# Patient Record
Sex: FEMALE | Race: WHITE | NOT HISPANIC OR LATINO | Employment: UNEMPLOYED | URBAN - METROPOLITAN AREA
[De-identification: names, ages, dates, MRNs, and addresses within clinical notes are randomized per-mention and may not be internally consistent; named-entity substitution may affect disease eponyms.]

---

## 2018-10-08 PROBLEM — M50.122 CERVICAL DISC DISORDER AT C5-C6 LEVEL WITH RADICULOPATHY: Status: ACTIVE | Noted: 2018-10-08

## 2018-11-21 PROBLEM — T81.89XA SURGICAL WOUND, NON HEALING, INITIAL ENCOUNTER: Status: ACTIVE | Noted: 2018-11-21

## 2019-02-10 ENCOUNTER — HOSPITAL ENCOUNTER (EMERGENCY)
Facility: HOSPITAL | Age: 44
Discharge: HOME | End: 2019-02-10
Attending: EMERGENCY MEDICINE

## 2019-02-10 VITALS
DIASTOLIC BLOOD PRESSURE: 63 MMHG | TEMPERATURE: 97 F | WEIGHT: 191.56 LBS | RESPIRATION RATE: 18 BRPM | BODY MASS INDEX: 32.88 KG/M2 | OXYGEN SATURATION: 100 % | HEART RATE: 79 BPM | SYSTOLIC BLOOD PRESSURE: 147 MMHG

## 2019-02-10 DIAGNOSIS — L02.91 ABSCESS: Primary | ICD-10-CM

## 2019-02-10 PROCEDURE — 87186 SC STD MICRODIL/AGAR DIL: CPT

## 2019-02-10 PROCEDURE — 87070 CULTURE OTHR SPECIMN AEROBIC: CPT

## 2019-02-10 PROCEDURE — 10160 PNXR ASPIR ABSC HMTMA BULLA: CPT

## 2019-02-10 PROCEDURE — 87077 CULTURE AEROBIC IDENTIFY: CPT

## 2019-02-10 PROCEDURE — 99284 EMERGENCY DEPT VISIT MOD MDM: CPT | Mod: 25

## 2019-02-10 PROCEDURE — 25000003 PHARM REV CODE 250: Performed by: EMERGENCY MEDICINE

## 2019-02-10 PROCEDURE — 10121 INC&RMVL FB SUBQ TISS COMP: CPT

## 2019-02-10 RX ORDER — MUPIROCIN 20 MG/G
1 OINTMENT TOPICAL
Status: COMPLETED | OUTPATIENT
Start: 2019-02-10 | End: 2019-02-10

## 2019-02-10 RX ORDER — CLINDAMYCIN HYDROCHLORIDE 300 MG/1
300 CAPSULE ORAL 4 TIMES DAILY
Qty: 28 CAPSULE | Refills: 0 | Status: SHIPPED | OUTPATIENT
Start: 2019-02-10 | End: 2019-02-17

## 2019-02-10 RX ORDER — MUPIROCIN 20 MG/G
OINTMENT TOPICAL 3 TIMES DAILY
Qty: 15 G | Refills: 0 | Status: SHIPPED | OUTPATIENT
Start: 2019-02-10 | End: 2019-02-20

## 2019-02-10 RX ORDER — HYDROCODONE BITARTRATE AND ACETAMINOPHEN 5; 325 MG/1; MG/1
1 TABLET ORAL EVERY 4 HOURS PRN
Qty: 15 TABLET | Refills: 0 | Status: SHIPPED | OUTPATIENT
Start: 2019-02-10 | End: 2019-02-20

## 2019-02-10 RX ORDER — CLINDAMYCIN HYDROCHLORIDE 150 MG/1
300 CAPSULE ORAL
Status: COMPLETED | OUTPATIENT
Start: 2019-02-10 | End: 2019-02-10

## 2019-02-10 RX ORDER — MUPIROCIN CALCIUM 20 MG/G
CREAM TOPICAL
Status: DISCONTINUED | OUTPATIENT
Start: 2019-02-10 | End: 2019-02-10

## 2019-02-10 RX ADMIN — MUPIROCIN 22 G: 20 OINTMENT TOPICAL at 07:02

## 2019-02-10 RX ADMIN — CLINDAMYCIN HYDROCHLORIDE 300 MG: 150 CAPSULE ORAL at 07:02

## 2019-02-11 NOTE — ED NOTES
Aspiration to red swollen area posterior neck done per Dr. Fernando. Wound culture collected. Patient tolerated well.

## 2019-02-11 NOTE — ED NOTES
Discharge instructions and rx x3 given, patient voiced understanding. Discharged to home in stable condition, ambulatory out of ER w steady gait, NAD.

## 2019-02-11 NOTE — ED PROVIDER NOTES
Encounter Date: 2/10/2019       History     Chief Complaint   Patient presents with    Abscess     Patient states history of wound issue to area after surgery.   States followed by Wound Care for issue in the past      The history is provided by the patient.   Abscess    This is a recurrent problem. The current episode started several days ago. The problem has been unchanged. The abscess is present on the neck. The pain is at a severity of 2/10. The abscess is characterized by draining, swelling and dryness. Pertinent negatives include no fever, no diarrhea, no vomiting, no congestion and no cough. There were no sick contacts. She has received no recent medical care.     Review of patient's allergies indicates:  No Known Allergies  Past Medical History:   Diagnosis Date    Anxiety     Cerebral palsy     Cervical disc disorder     Draining postoperative wound     Hypothyroidism     Renal stones     Sleep apnea with use of continuous positive airway pressure (CPAP)      Past Surgical History:   Procedure Laterality Date    CHOLECYSTECTOMY      DEBRIDEMENT, WOUND posterior neck N/A 11/26/2018    Performed by Quinten Connell MD at [facility] OR    DISCECTOMY, SPINE, MINIMALLY INVASIVE, USING MICROSCOPE, RIGHT C5-6 POSTERIOR MINIMALLY INVASIVE LAMINECTOMY FOR MICRODISCECTOMY  10/8/2018    Performed by Jose Daniel Stout MD at [facility] OR    FOOT SURGERY Left     tendon repair @ 1 yrs old    HYSTERECTOMY      TONSILLECTOMY       Family History   Problem Relation Age of Onset    Hypertension Mother     Cancer Mother     Heart disease Mother     No Known Problems Father      Social History     Tobacco Use    Smoking status: Never Smoker    Smokeless tobacco: Never Used   Substance Use Topics    Alcohol use: Yes     Alcohol/week: 1.2 oz     Types: 2 Cans of beer per week     Frequency: Monthly or less     Drinks per session: 1 or 2     Binge frequency: Less than monthly     Comment: occasionally    Drug use:  No     Review of Systems   Constitutional: Negative for fever.   HENT: Negative for congestion, ear discharge and ear pain.    Eyes: Negative for pain and redness.   Respiratory: Negative for cough.    Gastrointestinal: Negative for diarrhea and vomiting.   Genitourinary: Negative for flank pain.   Musculoskeletal: Negative for back pain, gait problem, neck pain and neck stiffness.   Skin: Positive for wound.   Neurological: Negative for weakness.       Physical Exam     Initial Vitals [02/10/19 1912]   BP Pulse Resp Temp SpO2   (!) 147/63 79 18 97.2 °F (36.2 °C) 100 %      MAP       --         Physical Exam    Nursing note and vitals reviewed.  Constitutional: She appears well-developed and well-nourished. She is not diaphoretic. No distress.   HENT:   Head: Normocephalic and atraumatic.   Eyes: EOM are normal. Pupils are equal, round, and reactive to light.   Neck: Normal range of motion. Neck supple. No JVD present.   Pulmonary/Chest: Breath sounds normal. No stridor. No respiratory distress. She has no wheezes. She has no rhonchi. She has no rales.   Abdominal: Soft. Bowel sounds are normal. She exhibits no distension. There is no tenderness. There is no rebound and no guarding.   Musculoskeletal: Normal range of motion. She exhibits no edema or tenderness.   Neurological: She is alert and oriented to person, place, and time. No sensory deficit.   Skin: Abscess noted.         ED Course   Abscess Aspiration  Date/Time: 2/10/2019 7:42 PM  Performed by: Julian Fernando MD  Authorized by: Julian Fernando MD     Consent Done?:  Not Needed  A time out verifies correct patient, procedure, equipment, support staff and site/side marked as required:   Procedure Details:     Site prepped with:  Betadine    Location of Abscess #1:  Posterior neck    Size of needle #1:  20    Aspirated amount #1 (mL):  1  Material send for:  Aerobic Culture  Post-procedure:     Patient tolerance:  Patient tolerated the procedure well with  no immediate complications        Labs Reviewed - No data to display       Imaging Results    None                               Clinical Impression:   The encounter diagnosis was Abscess.      Disposition:   Disposition: Discharged  Condition: Stable                        Julian Fernando MD  02/10/19 1945       Julian Fernando MD  02/10/19 1946

## 2019-02-11 NOTE — ED TRIAGE NOTES
Had sx to back of neck in Oct 2018, had wound vac due to lack of healing, wound vac removed and wound completely healed. Stated starting with redness, warmth and swelling 2 days ago

## 2019-02-13 LAB — BACTERIA SPEC AEROBE CULT: NORMAL

## 2020-10-15 ENCOUNTER — OFFICE VISIT (OUTPATIENT)
Dept: OBSTETRICS AND GYNECOLOGY | Facility: CLINIC | Age: 45
End: 2020-10-15
Payer: COMMERCIAL

## 2020-10-15 ENCOUNTER — PATIENT MESSAGE (OUTPATIENT)
Dept: OBSTETRICS AND GYNECOLOGY | Facility: CLINIC | Age: 45
End: 2020-10-15

## 2020-10-15 VITALS
DIASTOLIC BLOOD PRESSURE: 74 MMHG | WEIGHT: 185.38 LBS | RESPIRATION RATE: 16 BRPM | HEART RATE: 84 BPM | SYSTOLIC BLOOD PRESSURE: 90 MMHG | HEIGHT: 64 IN | BODY MASS INDEX: 31.65 KG/M2

## 2020-10-15 DIAGNOSIS — E66.9 OBESITY (BMI 30-39.9): ICD-10-CM

## 2020-10-15 DIAGNOSIS — Z90.710 HISTORY OF HYSTERECTOMY FOR BENIGN DISEASE: ICD-10-CM

## 2020-10-15 DIAGNOSIS — Z01.419 WELL WOMAN EXAM WITH ROUTINE GYNECOLOGICAL EXAM: Primary | ICD-10-CM

## 2020-10-15 DIAGNOSIS — Z12.31 ENCOUNTER FOR SCREENING MAMMOGRAM FOR MALIGNANT NEOPLASM OF BREAST: ICD-10-CM

## 2020-10-15 DIAGNOSIS — Z71.3 WEIGHT LOSS COUNSELING, ENCOUNTER FOR: ICD-10-CM

## 2020-10-15 PROCEDURE — 3008F BODY MASS INDEX DOCD: CPT | Mod: CPTII,S$GLB,, | Performed by: OBSTETRICS & GYNECOLOGY

## 2020-10-15 PROCEDURE — 99386 PR PREVENTIVE VISIT,NEW,40-64: ICD-10-PCS | Mod: S$GLB,,, | Performed by: OBSTETRICS & GYNECOLOGY

## 2020-10-15 PROCEDURE — 3008F PR BODY MASS INDEX (BMI) DOCUMENTED: ICD-10-PCS | Mod: CPTII,S$GLB,, | Performed by: OBSTETRICS & GYNECOLOGY

## 2020-10-15 PROCEDURE — 99999 PR PBB SHADOW E&M-EST. PATIENT-LVL III: ICD-10-PCS | Mod: PBBFAC,,, | Performed by: OBSTETRICS & GYNECOLOGY

## 2020-10-15 PROCEDURE — 99386 PREV VISIT NEW AGE 40-64: CPT | Mod: S$GLB,,, | Performed by: OBSTETRICS & GYNECOLOGY

## 2020-10-15 PROCEDURE — 99999 PR PBB SHADOW E&M-EST. PATIENT-LVL III: CPT | Mod: PBBFAC,,, | Performed by: OBSTETRICS & GYNECOLOGY

## 2020-10-15 RX ORDER — ALPRAZOLAM 0.25 MG/1
TABLET ORAL
COMMUNITY
End: 2021-04-28

## 2020-10-15 RX ORDER — BACLOFEN 10 MG/1
TABLET ORAL
COMMUNITY
Start: 2020-08-28

## 2020-10-15 RX ORDER — BUPROPION HYDROCHLORIDE 300 MG/1
TABLET ORAL
COMMUNITY
Start: 2020-10-05

## 2020-10-15 RX ORDER — GABAPENTIN 300 MG/1
300 CAPSULE ORAL
COMMUNITY
Start: 2020-09-22 | End: 2023-05-24

## 2020-10-15 RX ORDER — PHENTERMINE HYDROCHLORIDE 37.5 MG/1
37.5 TABLET ORAL
Qty: 30 TABLET | Refills: 0 | Status: SHIPPED | OUTPATIENT
Start: 2020-10-15 | End: 2020-10-15 | Stop reason: SDUPTHER

## 2020-10-15 RX ORDER — PHENTERMINE HYDROCHLORIDE 37.5 MG/1
37.5 TABLET ORAL
Qty: 30 TABLET | Refills: 0 | Status: SHIPPED | OUTPATIENT
Start: 2020-10-15 | End: 2021-04-28

## 2020-10-15 NOTE — PROGRESS NOTES
Subjective:    Patient ID: Raquel Mitchell is a 44 y.o. y.o. female.     Chief Complaint: Annual Well Woman Exam     History of Present Illness:  Raquel presents today for Annual Well Woman exam. She describes her menses as absent s/p hysterectomy.She denies pelvic pain.  She denies breast tenderness, masses, nipple discharge. She denies difficulty with urination or bowel movements. She denies menopausal symptoms such as hotflashes, vaginal dryness, and night sweats. She denies bloating, early satiety, or weight changes. She is sexually active. Contraception is by no method.    Patient reports she started dieting and has hit a plateau; desires something to aid in weight loss. Discussed R/B/A. Will send Rx for Adipex. Patient's BMI 32.     The following portions of the patient's history were reviewed and updated as appropriate: allergies, current medications, past family history, past medical history, past social history, past surgical history and problem list.      Menstrual History:   No LMP recorded. Patient has had a hysterectomy..     OB History    No obstetric history on file.         The following portions of the patient's history were reviewed and updated as appropriate: allergies, current medications, past family history, past medical history, past social history, past surgical history and problem list.        ROS:     Review of Systems   Constitutional: Negative for activity change, appetite change, chills, diaphoresis, fatigue, fever and unexpected weight change.   HENT: Negative for mouth sores and tinnitus.    Eyes: Negative for discharge and visual disturbance.   Respiratory: Negative for cough, shortness of breath and wheezing.    Cardiovascular: Negative for chest pain, palpitations and leg swelling.   Gastrointestinal: Negative for abdominal pain, bloating, blood in stool, constipation, diarrhea, nausea and vomiting.   Endocrine: Negative for diabetes, hair loss, hot flashes,  "hyperthyroidism and hypothyroidism.   Genitourinary: Negative for dysmenorrhea, dyspareunia, dysuria, flank pain, frequency, genital sores, hematuria, menstrual problem, pelvic pain, urgency, vaginal bleeding, vaginal discharge, vaginal pain, postcoital bleeding and vaginal odor.   Musculoskeletal: Negative for back pain, joint swelling and myalgias.   Integumentary:  Negative for rash, acne, breast mass, nipple discharge and breast skin changes.   Neurological: Negative for seizures, syncope, numbness and headaches.   Hematological: Negative for adenopathy. Does not bruise/bleed easily.   Psychiatric/Behavioral: Negative for depression and sleep disturbance. The patient is not nervous/anxious.    Breast: Negative for mass, mastodynia, nipple discharge and skin changes      Objective:    Vital Signs:  Vitals:    10/15/20 1202   Resp: 16   Weight: 84.1 kg (185 lb 6.4 oz)   Height: 5' 4" (1.626 m)         Physical Exam:  General:  alert, cooperative, appears stated age   Skin:  Skin color, texture, turgor normal. No rashes or lesions   HEENT:  conjunctivae/corneas clear. PERRL.   Neck: supple, trachea midline, no adenopathy or thyromegally   Respiratory:  clear to auscultation bilaterally   Heart:  regular rate and rhythm, S1, S2 normal, no murmur, click, rub or gallop   Breasts:  no discharge, erythema, or tenderness   Abdomen:  normal findings: bowel sounds normal, no masses palpable, no organomegaly and soft, non-tender   Pelvis: External genitalia: normal general appearance  Urinary system: urethral meatus normal, bladder nontender  Vaginal: atrophic mucosa  Cervix: absent, removed surgically  Uterus: absent, removed surgically  Adnexa: non palpable   Extremities: Normal ROM; no edema, no cyanosis   Neurologial: Normal strength and tone. No focal numbness or weakness. Reflexes 2+ and equal.   Psychiatric: normal mood, speech, dress, and thought processes         Assessment:       Healthy female exam.     1. Well " woman exam with routine gynecological exam    2. History of hysterectomy for benign disease    3. Encounter for screening mammogram for malignant neoplasm of breast    4. Weight loss counseling, encounter for    5. Obesity (BMI 30-39.9)          Plan:      Pap smear deferred per guidelines  MMG ordered  Rx of Adipex; RTC in 1 month for BP/weight check    COUNSELING:  Raquel was counseled on STD pevention, use and side-effects of various contraceptive measures, A.C.O.G. Pap guidelines and recommendations for yearly pelvic exams in addition to recommendations for monthly self breast exams; to see her PCP for other health maintenance.

## 2020-10-15 NOTE — TELEPHONE ENCOUNTER
----- Message from Nena Edwards MA sent at 10/15/2020 12:48 PM CDT -----  Contact: self  Raquel Mitchell  MRN: [id number]  Home Phone      [phone]  Work Phone      Not on file.  Mobile          [phone]    Patient Care Team:  Olga Pollack MD as PCP - General (Family Medicine)  OB? No  What phone number can you be reached at? [phone]  Message:  Stated Rx for Adipex was sent to wrong pharmacy.  Would like to know if Rx can be resent to [facility].    Pharmacy:  [facility]

## 2021-01-22 ENCOUNTER — HOSPITAL ENCOUNTER (OUTPATIENT)
Dept: RADIOLOGY | Facility: HOSPITAL | Age: 46
Discharge: HOME | End: 2021-01-22
Attending: OBSTETRICS & GYNECOLOGY
Payer: COMMERCIAL

## 2021-01-22 VITALS — WEIGHT: 185 LBS | HEIGHT: 64 IN | BODY MASS INDEX: 31.58 KG/M2

## 2021-01-22 DIAGNOSIS — Z12.31 ENCOUNTER FOR SCREENING MAMMOGRAM FOR MALIGNANT NEOPLASM OF BREAST: ICD-10-CM

## 2021-01-22 PROCEDURE — 77067 SCR MAMMO BI INCL CAD: CPT | Mod: 26,,, | Performed by: RADIOLOGY

## 2021-01-22 PROCEDURE — 77067 SCR MAMMO BI INCL CAD: CPT | Mod: TC

## 2021-01-22 PROCEDURE — 77063 MAMMO DIGITAL SCREENING BILAT WITH TOMO: ICD-10-PCS | Mod: 26,,, | Performed by: RADIOLOGY

## 2021-01-22 PROCEDURE — 77063 BREAST TOMOSYNTHESIS BI: CPT | Mod: 26,,, | Performed by: RADIOLOGY

## 2021-01-22 PROCEDURE — 77067 MAMMO DIGITAL SCREENING BILAT WITH TOMO: ICD-10-PCS | Mod: 26,,, | Performed by: RADIOLOGY

## 2021-02-09 ENCOUNTER — PATIENT MESSAGE (OUTPATIENT)
Dept: OBSTETRICS AND GYNECOLOGY | Facility: CLINIC | Age: 46
End: 2021-02-09

## 2021-04-06 ENCOUNTER — PATIENT MESSAGE (OUTPATIENT)
Dept: OBSTETRICS AND GYNECOLOGY | Facility: CLINIC | Age: 46
End: 2021-04-06

## 2021-04-26 ENCOUNTER — PATIENT MESSAGE (OUTPATIENT)
Dept: OBSTETRICS AND GYNECOLOGY | Facility: CLINIC | Age: 46
End: 2021-04-26

## 2021-04-28 ENCOUNTER — OFFICE VISIT (OUTPATIENT)
Dept: OBSTETRICS AND GYNECOLOGY | Facility: CLINIC | Age: 46
End: 2021-04-28
Payer: COMMERCIAL

## 2021-04-28 VITALS
WEIGHT: 183 LBS | BODY MASS INDEX: 31.24 KG/M2 | RESPIRATION RATE: 19 BRPM | HEART RATE: 64 BPM | HEIGHT: 64 IN | DIASTOLIC BLOOD PRESSURE: 70 MMHG | SYSTOLIC BLOOD PRESSURE: 92 MMHG

## 2021-04-28 DIAGNOSIS — N39.3 SUI (STRESS URINARY INCONTINENCE, FEMALE): Primary | ICD-10-CM

## 2021-04-28 DIAGNOSIS — N95.1 PERIMENOPAUSAL VASOMOTOR SYMPTOMS: ICD-10-CM

## 2021-04-28 DIAGNOSIS — R35.0 URINARY FREQUENCY: ICD-10-CM

## 2021-04-28 DIAGNOSIS — Z79.890 HORMONE REPLACEMENT THERAPY (HRT): ICD-10-CM

## 2021-04-28 LAB
BILIRUB SERPL-MCNC: ABNORMAL MG/DL
BLOOD URINE, POC: ABNORMAL
CLARITY, POC UA: CLEAR
COLOR, POC UA: YELLOW
GLUCOSE UR QL STRIP: ABNORMAL
KETONES UR QL STRIP: ABNORMAL
LEUKOCYTE ESTERASE URINE, POC: ABNORMAL
NITRITE, POC UA: ABNORMAL
PH, POC UA: 6
PROTEIN, POC: ABNORMAL
SPECIFIC GRAVITY, POC UA: 1.02
UROBILINOGEN, POC UA: ABNORMAL

## 2021-04-28 PROCEDURE — 99213 OFFICE O/P EST LOW 20 MIN: CPT | Mod: 25,S$GLB,, | Performed by: OBSTETRICS & GYNECOLOGY

## 2021-04-28 PROCEDURE — 3008F BODY MASS INDEX DOCD: CPT | Mod: CPTII,S$GLB,, | Performed by: OBSTETRICS & GYNECOLOGY

## 2021-04-28 PROCEDURE — 3008F PR BODY MASS INDEX (BMI) DOCUMENTED: ICD-10-PCS | Mod: CPTII,S$GLB,, | Performed by: OBSTETRICS & GYNECOLOGY

## 2021-04-28 PROCEDURE — 81002 POCT URINE DIPSTICK WITHOUT MICROSCOPE: ICD-10-PCS | Mod: S$GLB,,, | Performed by: OBSTETRICS & GYNECOLOGY

## 2021-04-28 PROCEDURE — 81002 URINALYSIS NONAUTO W/O SCOPE: CPT | Mod: S$GLB,,, | Performed by: OBSTETRICS & GYNECOLOGY

## 2021-04-28 PROCEDURE — 1126F AMNT PAIN NOTED NONE PRSNT: CPT | Mod: S$GLB,,, | Performed by: OBSTETRICS & GYNECOLOGY

## 2021-04-28 PROCEDURE — 99999 PR PBB SHADOW E&M-EST. PATIENT-LVL III: ICD-10-PCS | Mod: PBBFAC,,, | Performed by: OBSTETRICS & GYNECOLOGY

## 2021-04-28 PROCEDURE — 99999 PR PBB SHADOW E&M-EST. PATIENT-LVL III: CPT | Mod: PBBFAC,,, | Performed by: OBSTETRICS & GYNECOLOGY

## 2021-04-28 PROCEDURE — 1126F PR PAIN SEVERITY QUANTIFIED, NO PAIN PRESENT: ICD-10-PCS | Mod: S$GLB,,, | Performed by: OBSTETRICS & GYNECOLOGY

## 2021-04-28 PROCEDURE — 99213 PR OFFICE/OUTPT VISIT, EST, LEVL III, 20-29 MIN: ICD-10-PCS | Mod: 25,S$GLB,, | Performed by: OBSTETRICS & GYNECOLOGY

## 2021-04-28 RX ORDER — FLUOXETINE HYDROCHLORIDE 20 MG/1
20 CAPSULE ORAL DAILY
COMMUNITY
End: 2023-05-24

## 2021-04-28 RX ORDER — ESTRADIOL 0.5 MG/1
0.5 TABLET ORAL DAILY
Qty: 30 TABLET | Refills: 3 | Status: SHIPPED | OUTPATIENT
Start: 2021-04-28 | End: 2021-08-17

## 2021-05-04 ENCOUNTER — PATIENT MESSAGE (OUTPATIENT)
Dept: RESEARCH | Facility: HOSPITAL | Age: 46
End: 2021-05-04

## 2021-05-17 ENCOUNTER — PATIENT MESSAGE (OUTPATIENT)
Dept: OBSTETRICS AND GYNECOLOGY | Facility: CLINIC | Age: 46
End: 2021-05-17

## 2021-06-08 ENCOUNTER — PATIENT MESSAGE (OUTPATIENT)
Dept: OBSTETRICS AND GYNECOLOGY | Facility: CLINIC | Age: 46
End: 2021-06-08

## 2021-06-10 RX ORDER — BREMELANOTIDE 1.75 MG/.3ML
1.75 INJECTION SUBCUTANEOUS
Qty: 8 SYRINGE | Refills: 3 | Status: SHIPPED | OUTPATIENT
Start: 2021-06-10 | End: 2023-05-24

## 2023-05-09 ENCOUNTER — TELEPHONE (OUTPATIENT)
Dept: OBSTETRICS AND GYNECOLOGY | Facility: CLINIC | Age: 48
End: 2023-05-09
Payer: COMMERCIAL

## 2023-05-09 DIAGNOSIS — Z12.31 BREAST CANCER SCREENING BY MAMMOGRAM: Primary | ICD-10-CM

## 2023-05-09 NOTE — TELEPHONE ENCOUNTER
----- Message from Brina Barreto sent at 5/9/2023 10:03 AM CDT -----  Contact: self  Raquel Mitchell  MRN: [id number]  Home Phone      [phone]  Work Phone      Not on file.  Mobile          [phone]    Patient Care Team:  Olga Pollack MD as PCP - General (Family Medicine)  Josephine Fernández MD as Consulting Physician (Obstetrics and Gynecology)  OB? No  What phone number can you be reached at? [phone]  Message: mammo orders are needed for 05/24

## 2023-05-24 ENCOUNTER — PATIENT MESSAGE (OUTPATIENT)
Dept: OBSTETRICS AND GYNECOLOGY | Facility: CLINIC | Age: 48
End: 2023-05-24

## 2023-05-24 ENCOUNTER — HOSPITAL ENCOUNTER (OUTPATIENT)
Dept: RADIOLOGY | Facility: HOSPITAL | Age: 48
Discharge: HOME | End: 2023-05-24
Attending: OBSTETRICS & GYNECOLOGY
Payer: COMMERCIAL

## 2023-05-24 ENCOUNTER — OFFICE VISIT (OUTPATIENT)
Dept: OBSTETRICS AND GYNECOLOGY | Facility: CLINIC | Age: 48
End: 2023-05-24
Payer: COMMERCIAL

## 2023-05-24 VITALS
SYSTOLIC BLOOD PRESSURE: 118 MMHG | HEART RATE: 70 BPM | DIASTOLIC BLOOD PRESSURE: 72 MMHG | OXYGEN SATURATION: 97 % | WEIGHT: 185.81 LBS | BODY MASS INDEX: 31.72 KG/M2 | RESPIRATION RATE: 18 BRPM | HEIGHT: 64 IN

## 2023-05-24 VITALS — WEIGHT: 183 LBS | HEIGHT: 64 IN | BODY MASS INDEX: 31.24 KG/M2

## 2023-05-24 DIAGNOSIS — Z12.31 BREAST CANCER SCREENING BY MAMMOGRAM: ICD-10-CM

## 2023-05-24 DIAGNOSIS — Z71.3 WEIGHT LOSS COUNSELING, ENCOUNTER FOR: ICD-10-CM

## 2023-05-24 DIAGNOSIS — Z79.890 HORMONE REPLACEMENT THERAPY (HRT): Primary | ICD-10-CM

## 2023-05-24 PROCEDURE — 77067 SCR MAMMO BI INCL CAD: CPT | Mod: TC

## 2023-05-24 PROCEDURE — 3074F SYST BP LT 130 MM HG: CPT | Mod: CPTII,S$GLB,, | Performed by: OBSTETRICS & GYNECOLOGY

## 2023-05-24 PROCEDURE — 3074F PR MOST RECENT SYSTOLIC BLOOD PRESSURE < 130 MM HG: ICD-10-PCS | Mod: CPTII,S$GLB,, | Performed by: OBSTETRICS & GYNECOLOGY

## 2023-05-24 PROCEDURE — 77063 BREAST TOMOSYNTHESIS BI: CPT | Mod: 26,,, | Performed by: RADIOLOGY

## 2023-05-24 PROCEDURE — 77063 MAMMO DIGITAL SCREENING BILAT WITH TOMO: ICD-10-PCS | Mod: 26,,, | Performed by: RADIOLOGY

## 2023-05-24 PROCEDURE — 3008F BODY MASS INDEX DOCD: CPT | Mod: CPTII,S$GLB,, | Performed by: OBSTETRICS & GYNECOLOGY

## 2023-05-24 PROCEDURE — 99213 PR OFFICE/OUTPT VISIT, EST, LEVL III, 20-29 MIN: ICD-10-PCS | Mod: S$GLB,,, | Performed by: OBSTETRICS & GYNECOLOGY

## 2023-05-24 PROCEDURE — 99213 OFFICE O/P EST LOW 20 MIN: CPT | Mod: S$GLB,,, | Performed by: OBSTETRICS & GYNECOLOGY

## 2023-05-24 PROCEDURE — 3078F PR MOST RECENT DIASTOLIC BLOOD PRESSURE < 80 MM HG: ICD-10-PCS | Mod: CPTII,S$GLB,, | Performed by: OBSTETRICS & GYNECOLOGY

## 2023-05-24 PROCEDURE — 77067 MAMMO DIGITAL SCREENING BILAT WITH TOMO: ICD-10-PCS | Mod: 26,,, | Performed by: RADIOLOGY

## 2023-05-24 PROCEDURE — 77067 SCR MAMMO BI INCL CAD: CPT | Mod: 26,,, | Performed by: RADIOLOGY

## 2023-05-24 PROCEDURE — 1159F MED LIST DOCD IN RCRD: CPT | Mod: CPTII,S$GLB,, | Performed by: OBSTETRICS & GYNECOLOGY

## 2023-05-24 PROCEDURE — 3008F PR BODY MASS INDEX (BMI) DOCUMENTED: ICD-10-PCS | Mod: CPTII,S$GLB,, | Performed by: OBSTETRICS & GYNECOLOGY

## 2023-05-24 PROCEDURE — 99999 PR PBB SHADOW E&M-EST. PATIENT-LVL III: CPT | Mod: PBBFAC,,, | Performed by: OBSTETRICS & GYNECOLOGY

## 2023-05-24 PROCEDURE — 99999 PR PBB SHADOW E&M-EST. PATIENT-LVL III: ICD-10-PCS | Mod: PBBFAC,,, | Performed by: OBSTETRICS & GYNECOLOGY

## 2023-05-24 PROCEDURE — 3078F DIAST BP <80 MM HG: CPT | Mod: CPTII,S$GLB,, | Performed by: OBSTETRICS & GYNECOLOGY

## 2023-05-24 PROCEDURE — 1159F PR MEDICATION LIST DOCUMENTED IN MEDICAL RECORD: ICD-10-PCS | Mod: CPTII,S$GLB,, | Performed by: OBSTETRICS & GYNECOLOGY

## 2023-05-24 RX ORDER — CYCLOBENZAPRINE HCL 10 MG
10 TABLET ORAL NIGHTLY
COMMUNITY
Start: 2023-05-17

## 2023-05-24 RX ORDER — GABAPENTIN 400 MG/1
400 CAPSULE ORAL 3 TIMES DAILY
COMMUNITY
Start: 2023-05-08

## 2023-05-24 RX ORDER — LEVOTHYROXINE SODIUM 25 UG/1
25 TABLET ORAL
COMMUNITY
Start: 2023-05-08

## 2023-05-24 RX ORDER — MELOXICAM 15 MG/1
15 TABLET ORAL
COMMUNITY
Start: 2023-05-17

## 2023-05-24 RX ORDER — FLUOXETINE HYDROCHLORIDE 40 MG/1
40 CAPSULE ORAL
COMMUNITY
Start: 2023-05-08

## 2023-05-24 RX ORDER — ESTRADIOL 0.5 MG/1
0.5 TABLET ORAL DAILY
Qty: 90 EACH | Refills: 3 | Status: SHIPPED | OUTPATIENT
Start: 2023-05-24

## 2023-05-24 NOTE — PROGRESS NOTES
Subjective:    Patient ID: Raquel Mitchell is a 47 y.o. y.o. female.     Chief Complaint:   Chief Complaint   Patient presents with    Weight Loss    Consult       History of Present Illness:  Raquel presents today requesting to have her HRT refilled. She was doing good on 0.5mg and desires continue. She is also interested in weight loss and will call her insurance for coverage.       ROS:   Review of Systems   Constitutional:  Negative for activity change, appetite change, chills, diaphoresis, fatigue, fever and unexpected weight change.   HENT:  Negative for mouth sores and tinnitus.    Eyes:  Negative for discharge and visual disturbance.   Respiratory:  Negative for cough, shortness of breath and wheezing.    Cardiovascular:  Negative for chest pain, palpitations and leg swelling.   Gastrointestinal:  Negative for abdominal pain, bloating, blood in stool, constipation, diarrhea, nausea and vomiting.   Endocrine: Negative for diabetes, hair loss, hot flashes, hyperthyroidism and hypothyroidism.   Genitourinary:  Negative for dysuria, flank pain, frequency, genital sores, hematuria, urgency, vaginal bleeding, vaginal discharge, vaginal pain, postcoital bleeding and vaginal odor.   Musculoskeletal:  Negative for back pain, joint swelling and myalgias.   Integumentary:  Negative for rash, acne, breast mass, nipple discharge and breast skin changes.   Neurological:  Negative for seizures, syncope, numbness and headaches.   Hematological:  Negative for adenopathy. Does not bruise/bleed easily.   Psychiatric/Behavioral:  Negative for depression and sleep disturbance. The patient is not nervous/anxious.    Breast: Negative for mass, mastodynia, nipple discharge and skin changes        Objective:    Vital Signs:  Vitals:    05/24/23 1402   BP: 118/72   Pulse: 70   Resp: 18       Physical Exam:  General:  alert, cooperative, no distress   Head Normocephalic, without obvious abnormality, atraumatic   Neck  .supple, symmetrical, trachea midline   Skin:  Skin color, texture, turgor normal. No rashes or lesions   Abdomen:  soft, non-tender; bowel sounds normal   Pelvis: deferred   Extremities extremities normal, atraumatic, no cyanosis or edema   Neurologic Grossly normal          Assessment:      1. Hormone replacement therapy (HRT)    2. Weight loss counseling, encounter for          Plan:      PLAN:   1. Refill HRT  2. Annual scheduled

## 2023-05-25 RX ORDER — PHENTERMINE HYDROCHLORIDE 37.5 MG/1
37.5 TABLET ORAL
Qty: 30 TABLET | Refills: 0 | Status: SHIPPED | OUTPATIENT
Start: 2023-05-25

## 2023-06-28 ENCOUNTER — PATIENT MESSAGE (OUTPATIENT)
Dept: OBSTETRICS AND GYNECOLOGY | Facility: CLINIC | Age: 48
End: 2023-06-28
Payer: COMMERCIAL

## 2023-06-28 RX ORDER — PHENTERMINE HYDROCHLORIDE 37.5 MG/1
37.5 TABLET ORAL
Qty: 30 TABLET | Refills: 0 | OUTPATIENT
Start: 2023-06-28

## 2023-06-29 ENCOUNTER — PATIENT MESSAGE (OUTPATIENT)
Dept: OBSTETRICS AND GYNECOLOGY | Facility: CLINIC | Age: 48
End: 2023-06-29
Payer: COMMERCIAL

## 2024-04-22 RX ORDER — ESTRADIOL 0.5 MG/1
0.5 TABLET ORAL
Qty: 90 TABLET | Refills: 0 | Status: SHIPPED | OUTPATIENT
Start: 2024-04-22

## 2024-04-23 NOTE — TELEPHONE ENCOUNTER
Refill Decision Note   Raquel Paula  is requesting a refill authorization.  Brief Assessment and Rationale for Refill:  Approve     Medication Therapy Plan:        Comments:     Note composed:8:21 PM 04/22/2024

## 2024-07-22 NOTE — TELEPHONE ENCOUNTER
Refill Routing Note   Medication(s) are not appropriate for processing by [facility] for the following reason(s):        Required vitals outdated  Required labs outdated( mammogram)  Due for OV    [facility] action(s):  Defer               Appointments  past 12m or future 3m with PCP    Date Provider   Last Visit   5/24/2023 Josephine Fernández MD   Next Visit   Visit date not found Josephine Fernández MD   ED visits in past 90 days: 0        Note composed:1:40 PM 07/22/2024

## 2024-07-23 RX ORDER — ESTRADIOL 0.5 MG/1
0.5 TABLET ORAL
Qty: 90 TABLET | Refills: 0 | OUTPATIENT
Start: 2024-07-23